# Patient Record
Sex: FEMALE | Race: WHITE | NOT HISPANIC OR LATINO | Employment: FULL TIME | URBAN - METROPOLITAN AREA
[De-identification: names, ages, dates, MRNs, and addresses within clinical notes are randomized per-mention and may not be internally consistent; named-entity substitution may affect disease eponyms.]

---

## 2023-08-18 ENCOUNTER — HOSPITAL ENCOUNTER (OUTPATIENT)
Dept: DATA CONVERSION | Facility: HOSPITAL | Age: 43
Discharge: HOME | End: 2023-08-18
Payer: COMMERCIAL

## 2023-08-18 DIAGNOSIS — G35 MULTIPLE SCLEROSIS (MULTI): ICD-10-CM

## 2023-09-07 PROBLEM — E03.8 SUBCLINICAL HYPOTHYROIDISM: Status: ACTIVE | Noted: 2023-09-07

## 2023-09-07 PROBLEM — G35 MULTIPLE SCLEROSIS (MULTI): Status: ACTIVE | Noted: 2023-09-07

## 2023-09-07 PROBLEM — E78.2 MIXED HYPERLIPIDEMIA: Status: ACTIVE | Noted: 2023-09-07

## 2023-09-07 PROBLEM — E55.9 VITAMIN D DEFICIENCY: Status: ACTIVE | Noted: 2023-09-07

## 2023-09-07 RX ORDER — IBUPROFEN 200 MG
600 TABLET ORAL EVERY 4 HOURS PRN
COMMUNITY

## 2023-09-07 RX ORDER — CHOLECALCIFEROL (VITAMIN D3) 25 MCG
1 TABLET ORAL DAILY
COMMUNITY
Start: 2022-12-09

## 2023-09-07 RX ORDER — ACETAMINOPHEN 500 MG
5 TABLET ORAL NIGHTLY
COMMUNITY
Start: 2022-12-09

## 2023-09-07 RX ORDER — BIOTIN 10 MG
1 TABLET ORAL DAILY
COMMUNITY
Start: 2022-12-09

## 2023-09-07 RX ORDER — NORGESTIMATE AND ETHINYL ESTRADIOL 7DAYSX3 28
1 KIT ORAL DAILY
COMMUNITY
End: 2024-03-05 | Stop reason: SDUPTHER

## 2023-09-07 RX ORDER — CHOLECALCIFEROL (VITAMIN D3) 125 MCG
CAPSULE ORAL
COMMUNITY
Start: 2022-12-09

## 2023-12-04 ENCOUNTER — OFFICE VISIT (OUTPATIENT)
Dept: NEUROLOGY | Facility: CLINIC | Age: 43
End: 2023-12-04
Payer: COMMERCIAL

## 2023-12-04 DIAGNOSIS — G35 MULTIPLE SCLEROSIS (MULTI): Primary | ICD-10-CM

## 2023-12-04 PROCEDURE — 99214 OFFICE O/P EST MOD 30 MIN: CPT | Performed by: PSYCHIATRY & NEUROLOGY

## 2023-12-04 NOTE — PATIENT INSTRUCTIONS
You continue to do well on Ocrevus with no new lesions on your MRI this past summer compared to 2022. Please have your next Ocrevus infusion in January, but also get blood counts and antibody levels checked before the next dose. I will notify you of the results. I will see you back in 6 months.

## 2023-12-04 NOTE — PROGRESS NOTES
"Chief complaint:    MS    History of Present Illness:     Wendy continues to do well with a little blurriness in her right eye after optic neuritis in 2008. She has not had other symptoms. She has not had new visual symptoms, headache, memory issues, difficulty with speech or swallowing. She has not had weakness, numbness, balance problems or urinary symptoms. She ran her first marathon this year and recently ran a half marathon. She continues to run on a treadmill and do weight training during the winter.     She has not had any problem with Ocrevus and will get the next infusion in January. She gets enough sleep. She has not had depression or fatigue but admits to having anxiety - \"not being able to turn my brain off,\" which can affect her ability to fall asleep.      Physical examination:    She is a very pleasant, healthy-appearing woman.  Neurologic Exam     Mental Status   Her speech was clear, fluent and appropriate.     Cranial Nerves     CN II   Visual fields full to confrontation.     CN III, IV, VI   Extraocular motions are normal.     CN V   Facial sensation intact.     CN VII   Facial expression full, symmetric.     Diminished acuity with the right eye with no red desaturation.     Motor Exam   Muscle bulk: normal  Overall muscle tone: normal    Strength   Right deltoid: 5/5  Left deltoid: 5/5  Right biceps: 5/5  Left biceps: 5/5  Right interossei: 5/5  Left interossei: 5/5  Right iliopsoas: 5/5  Left iliopsoas: 5/5    Sensory Exam   Light touch normal.     Gait, Coordination, and Reflexes     Coordination   Finger to nose coordination: normal    Reflexes   Right brachioradialis: 2+  Left brachioradialis: 2+  Right biceps: 1+  Left biceps: 1+  Right triceps: 2+  Left triceps: 2+  Right patellar: 1+  Left patellar: 1+  Right achilles: 2+  Left achilles: 2+  Right plantar: normal  Left plantar: normal  Her gait was narrow-based and steady. She could perform a tandem gait and hop repeatedly on either foot. "          No diagnosis found.   1. Multiple sclerosis (CMS/HCC)  CBC and Auto Differential    Immunoglobulins (IgG, IgA, IgM)           No orders of the defined types were placed in this encounter.         Impression and Plan:    Wendy continues to remain asymptomatic except for right eye blurriness from optic neuritis in 2008, but has had increasing white matter lesions on several MS drugs, but MRI of the brain in August did not show new lesions in the past year on Ocrevus. She will have blood counts and antibody levels checked. Her next Ocrevus infusion is in January. Follow up in 6 months and plan MRI of the brain in 8/24 unless new symptoms occur before then. We will then most likely transfer her care to Dr. Spencer since I will retire in 9/24.   Allison Bingham MD

## 2023-12-08 ENCOUNTER — LAB (OUTPATIENT)
Dept: LAB | Facility: LAB | Age: 43
End: 2023-12-08
Payer: COMMERCIAL

## 2023-12-08 DIAGNOSIS — G35 MULTIPLE SCLEROSIS (MULTI): ICD-10-CM

## 2023-12-08 LAB
BASOPHILS # BLD AUTO: 0.06 X10*3/UL (ref 0–0.1)
BASOPHILS NFR BLD AUTO: 1.2 %
EOSINOPHIL # BLD AUTO: 0.11 X10*3/UL (ref 0–0.7)
EOSINOPHIL NFR BLD AUTO: 2.3 %
ERYTHROCYTE [DISTWIDTH] IN BLOOD BY AUTOMATED COUNT: 13 % (ref 11.5–14.5)
HCT VFR BLD AUTO: 39.5 % (ref 36–46)
HGB BLD-MCNC: 12.6 G/DL (ref 12–16)
IMM GRANULOCYTES # BLD AUTO: 0.01 X10*3/UL (ref 0–0.7)
IMM GRANULOCYTES NFR BLD AUTO: 0.2 % (ref 0–0.9)
LYMPHOCYTES # BLD AUTO: 1.17 X10*3/UL (ref 1.2–4.8)
LYMPHOCYTES NFR BLD AUTO: 24.3 %
MCH RBC QN AUTO: 31.5 PG (ref 26–34)
MCHC RBC AUTO-ENTMCNC: 31.9 G/DL (ref 32–36)
MCV RBC AUTO: 99 FL (ref 80–100)
MONOCYTES # BLD AUTO: 0.42 X10*3/UL (ref 0.1–1)
MONOCYTES NFR BLD AUTO: 8.7 %
NEUTROPHILS # BLD AUTO: 3.05 X10*3/UL (ref 1.2–7.7)
NEUTROPHILS NFR BLD AUTO: 63.3 %
NRBC BLD-RTO: 1.5 /100 WBCS (ref 0–0)
PLATELET # BLD AUTO: 310 X10*3/UL (ref 150–450)
RBC # BLD AUTO: 4 X10*6/UL (ref 4–5.2)
WBC # BLD AUTO: 4.8 X10*3/UL (ref 4.4–11.3)

## 2023-12-08 PROCEDURE — 36415 COLL VENOUS BLD VENIPUNCTURE: CPT

## 2023-12-08 PROCEDURE — 85025 COMPLETE CBC W/AUTO DIFF WBC: CPT

## 2023-12-08 PROCEDURE — 82784 ASSAY IGA/IGD/IGG/IGM EACH: CPT

## 2023-12-09 LAB
IGA SERPL-MCNC: 136 MG/DL (ref 70–400)
IGG SERPL-MCNC: 879 MG/DL (ref 700–1600)
IGM SERPL-MCNC: 13 MG/DL (ref 40–230)

## 2024-01-30 ASSESSMENT — PROMIS GLOBAL HEALTH SCALE
CARRYOUT_PHYSICAL_ACTIVITIES: COMPLETELY
RATE_SOCIAL_SATISFACTION: EXCELLENT
RATE_MENTAL_HEALTH: VERY GOOD
RATE_GENERAL_HEALTH: VERY GOOD
RATE_PHYSICAL_HEALTH: EXCELLENT
RATE_QUALITY_OF_LIFE: EXCELLENT
EMOTIONAL_PROBLEMS: NEVER
RATE_AVERAGE_PAIN: 2
CARRYOUT_SOCIAL_ACTIVITIES: EXCELLENT

## 2024-01-31 ENCOUNTER — OFFICE VISIT (OUTPATIENT)
Dept: PRIMARY CARE | Facility: CLINIC | Age: 44
End: 2024-01-31
Payer: COMMERCIAL

## 2024-01-31 VITALS
SYSTOLIC BLOOD PRESSURE: 104 MMHG | HEART RATE: 48 BPM | BODY MASS INDEX: 22.53 KG/M2 | OXYGEN SATURATION: 99 % | TEMPERATURE: 97.7 F | WEIGHT: 132 LBS | HEIGHT: 64 IN | DIASTOLIC BLOOD PRESSURE: 64 MMHG

## 2024-01-31 DIAGNOSIS — E55.9 VITAMIN D DEFICIENCY: ICD-10-CM

## 2024-01-31 DIAGNOSIS — E03.8 SUBCLINICAL HYPOTHYROIDISM: ICD-10-CM

## 2024-01-31 DIAGNOSIS — Z00.00 ANNUAL PHYSICAL EXAM: Primary | ICD-10-CM

## 2024-01-31 DIAGNOSIS — G35 MULTIPLE SCLEROSIS (MULTI): ICD-10-CM

## 2024-01-31 DIAGNOSIS — E78.2 MIXED HYPERLIPIDEMIA: ICD-10-CM

## 2024-01-31 DIAGNOSIS — Z12.31 SCREENING MAMMOGRAM FOR BREAST CANCER: ICD-10-CM

## 2024-01-31 PROCEDURE — 99396 PREV VISIT EST AGE 40-64: CPT | Performed by: INTERNAL MEDICINE

## 2024-01-31 PROCEDURE — 1036F TOBACCO NON-USER: CPT | Performed by: INTERNAL MEDICINE

## 2024-01-31 ASSESSMENT — ENCOUNTER SYMPTOMS
OCCASIONAL FEELINGS OF UNSTEADINESS: 0
DEPRESSION: 0
LOSS OF SENSATION IN FEET: 0

## 2024-01-31 ASSESSMENT — PATIENT HEALTH QUESTIONNAIRE - PHQ9
2. FEELING DOWN, DEPRESSED OR HOPELESS: NOT AT ALL
1. LITTLE INTEREST OR PLEASURE IN DOING THINGS: NOT AT ALL
SUM OF ALL RESPONSES TO PHQ9 QUESTIONS 1 AND 2: 0

## 2024-01-31 ASSESSMENT — COLUMBIA-SUICIDE SEVERITY RATING SCALE - C-SSRS
1. IN THE PAST MONTH, HAVE YOU WISHED YOU WERE DEAD OR WISHED YOU COULD GO TO SLEEP AND NOT WAKE UP?: NO
2. HAVE YOU ACTUALLY HAD ANY THOUGHTS OF KILLING YOURSELF?: NO
6. HAVE YOU EVER DONE ANYTHING, STARTED TO DO ANYTHING, OR PREPARED TO DO ANYTHING TO END YOUR LIFE?: NO

## 2024-01-31 ASSESSMENT — PAIN SCALES - GENERAL: PAINLEVEL: 0-NO PAIN

## 2024-01-31 NOTE — PROGRESS NOTES
"Subjective   Patient ID: Wendy Suggs is a 43 y.o. female who presents for Annual Exam.    HPI     Has past medical history of multiple sclerosis. Symptoms well controlled on current Ocrevus infusion.         She is a marathon runner, did full [place] in May 2023             Review of Systems   Constitutional:  Negative for chills, fatigue and unexpected weight change.   HENT:  Negative for postnasal drip, sinus pressure and trouble swallowing.    Respiratory:  Negative for cough, shortness of breath and wheezing.    Cardiovascular:  Negative for chest pain, palpitations and leg swelling.   Gastrointestinal:  Negative for abdominal pain, blood in stool, nausea and vomiting.   Endocrine: Negative for polydipsia, polyphagia and polyuria.   Genitourinary:  Negative for dysuria and frequency.   Musculoskeletal:  Negative for back pain and myalgias.   Skin:  Negative for rash.   Neurological:  Negative for tremors, seizures and numbness.   Psychiatric/Behavioral:  Negative for behavioral problems.        Objective   /64 (BP Location: Left arm, Patient Position: Sitting, BP Cuff Size: Small adult)   Pulse (!) 48   Temp 36.5 °C (97.7 °F) (Temporal)   Ht 1.626 m (5' 4\")   Wt 59.9 kg (132 lb)   SpO2 99%   BMI 22.66 kg/m²     Physical Exam  Constitutional:       General: She is not in acute distress.  HENT:      Head: Normocephalic and atraumatic.      Right Ear: Tympanic membrane normal.      Left Ear: Tympanic membrane normal.   Eyes:      Extraocular Movements: Extraocular movements intact.      Conjunctiva/sclera: Conjunctivae normal.      Pupils: Pupils are equal, round, and reactive to light.   Neck:      Vascular: No carotid bruit.   Cardiovascular:      Rate and Rhythm: Normal rate and regular rhythm.      Pulses: Normal pulses.      Heart sounds: No murmur heard.  Pulmonary:      Effort: Pulmonary effort is normal.      Breath sounds: Normal breath sounds. No wheezing or rales.   Abdominal:      General: " Bowel sounds are normal.      Palpations: Abdomen is soft. There is no mass.      Tenderness: There is no abdominal tenderness. There is no guarding.   Musculoskeletal:         General: Normal range of motion.      Cervical back: Normal range of motion and neck supple.      Right lower leg: No edema.      Left lower leg: No edema.   Lymphadenopathy:      Cervical: No cervical adenopathy.   Skin:     General: Skin is warm and dry.      Findings: No lesion.   Neurological:      General: No focal deficit present.      Mental Status: She is alert and oriented to person, place, and time.      Cranial Nerves: No cranial nerve deficit.      Gait: Gait normal.   Psychiatric:         Mood and Affect: Mood normal.         Assessment/Plan        Wendy was seen today for annual exam.  Diagnoses and all orders for this visit:  Annual physical exam (Primary)  Multiple sclerosis (CMS/HCC)  -     Comprehensive Metabolic Panel; Future  Subclinical hypothyroidism  -     TSH with reflex to Free T4 if abnormal; Future  Mixed hyperlipidemia  -     Lipid Panel; Future  Vitamin D deficiency  -     Vitamin D 25-Hydroxy,Total (for eval of Vitamin D levels); Future  Screening mammogram for breast cancer  -     BI mammo bilateral screening tomosynthesis; Future       Current Outpatient Medications   Medication Instructions    biotin 10 mg tablet 1 tablet, oral, Daily    cholecalciferol (Vitamin D-3) 25 MCG (1000 UT) tablet 1 tablet, oral, Daily    ibuprofen 600 mg, oral, Every 4 hours PRN    Lactobacillus acidophilus (Probiotic Acidophilus) 1.5 mg (250 million cell) capsule Probiotic Acidophilus Oral Capsule   Refills: 0        Allison Bingham MD   Start : 9-Dec-2022   Active    melatonin 5 mg, oral, Nightly    norgestimate-ethinyl estradioL (Tri-Estarylla) 0.18/0.215/0.25 mg-35 mcg (28) tablet 1 tablet, oral, Daily, for 90 day(s)    ocrelizumab (OCREVUS IV) intravenous, Every 6 months

## 2024-02-01 ASSESSMENT — ENCOUNTER SYMPTOMS
TREMORS: 0
NUMBNESS: 0
DYSURIA: 0
WHEEZING: 0
UNEXPECTED WEIGHT CHANGE: 0
SHORTNESS OF BREATH: 0
FREQUENCY: 0
VOMITING: 0
PALPITATIONS: 0
NAUSEA: 0
CHILLS: 0
BACK PAIN: 0
ABDOMINAL PAIN: 0
MYALGIAS: 0
POLYDIPSIA: 0
COUGH: 0
SEIZURES: 0
SINUS PRESSURE: 0
POLYPHAGIA: 0
FATIGUE: 0
TROUBLE SWALLOWING: 0
BLOOD IN STOOL: 0

## 2024-02-13 ENCOUNTER — LAB (OUTPATIENT)
Dept: LAB | Facility: LAB | Age: 44
End: 2024-02-13
Payer: COMMERCIAL

## 2024-02-13 ENCOUNTER — HOSPITAL ENCOUNTER (OUTPATIENT)
Dept: RADIOLOGY | Facility: HOSPITAL | Age: 44
Discharge: HOME | End: 2024-02-13
Payer: COMMERCIAL

## 2024-02-13 VITALS — HEIGHT: 64 IN | BODY MASS INDEX: 21.85 KG/M2 | WEIGHT: 128 LBS

## 2024-02-13 DIAGNOSIS — Z12.31 SCREENING MAMMOGRAM FOR BREAST CANCER: ICD-10-CM

## 2024-02-13 DIAGNOSIS — E55.9 VITAMIN D DEFICIENCY: ICD-10-CM

## 2024-02-13 DIAGNOSIS — G35 MULTIPLE SCLEROSIS (MULTI): ICD-10-CM

## 2024-02-13 DIAGNOSIS — E03.8 SUBCLINICAL HYPOTHYROIDISM: ICD-10-CM

## 2024-02-13 DIAGNOSIS — E78.2 MIXED HYPERLIPIDEMIA: ICD-10-CM

## 2024-02-13 LAB
25(OH)D3 SERPL-MCNC: 68 NG/ML (ref 30–100)
ALBUMIN SERPL BCP-MCNC: 4.1 G/DL (ref 3.4–5)
ALP SERPL-CCNC: 44 U/L (ref 33–110)
ALT SERPL W P-5'-P-CCNC: 11 U/L (ref 7–45)
ANION GAP SERPL CALC-SCNC: 12 MMOL/L (ref 10–20)
AST SERPL W P-5'-P-CCNC: 16 U/L (ref 9–39)
BILIRUB SERPL-MCNC: 0.5 MG/DL (ref 0–1.2)
BUN SERPL-MCNC: 16 MG/DL (ref 6–23)
CALCIUM SERPL-MCNC: 9.3 MG/DL (ref 8.6–10.3)
CHLORIDE SERPL-SCNC: 103 MMOL/L (ref 98–107)
CHOLEST SERPL-MCNC: 167 MG/DL (ref 0–199)
CHOLESTEROL/HDL RATIO: 1.9
CO2 SERPL-SCNC: 27 MMOL/L (ref 21–32)
CREAT SERPL-MCNC: 0.92 MG/DL (ref 0.5–1.05)
EGFRCR SERPLBLD CKD-EPI 2021: 80 ML/MIN/1.73M*2
GLUCOSE SERPL-MCNC: 85 MG/DL (ref 74–99)
HDLC SERPL-MCNC: 86.4 MG/DL
LDLC SERPL CALC-MCNC: 62 MG/DL
NON HDL CHOLESTEROL: 81 MG/DL (ref 0–149)
POTASSIUM SERPL-SCNC: 4.2 MMOL/L (ref 3.5–5.3)
PROT SERPL-MCNC: 6.4 G/DL (ref 6.4–8.2)
SODIUM SERPL-SCNC: 138 MMOL/L (ref 136–145)
TRIGL SERPL-MCNC: 94 MG/DL (ref 0–149)
TSH SERPL-ACNC: 2.9 MIU/L (ref 0.44–3.98)
VLDL: 19 MG/DL (ref 0–40)

## 2024-02-13 PROCEDURE — 80053 COMPREHEN METABOLIC PANEL: CPT

## 2024-02-13 PROCEDURE — 77067 SCR MAMMO BI INCL CAD: CPT

## 2024-02-13 PROCEDURE — 80061 LIPID PANEL: CPT

## 2024-02-13 PROCEDURE — 84443 ASSAY THYROID STIM HORMONE: CPT

## 2024-02-13 PROCEDURE — 36415 COLL VENOUS BLD VENIPUNCTURE: CPT

## 2024-02-13 PROCEDURE — 82306 VITAMIN D 25 HYDROXY: CPT

## 2024-03-05 DIAGNOSIS — Z30.9 ENCOUNTER FOR CONTRACEPTIVE MANAGEMENT, UNSPECIFIED TYPE: Primary | ICD-10-CM

## 2024-03-05 RX ORDER — NORGESTIMATE AND ETHINYL ESTRADIOL 7DAYSX3 28
1 KIT ORAL DAILY
Qty: 28 TABLET | Refills: 11 | Status: SHIPPED | OUTPATIENT
Start: 2024-03-05

## 2024-06-03 ENCOUNTER — APPOINTMENT (OUTPATIENT)
Dept: NEUROLOGY | Facility: CLINIC | Age: 44
End: 2024-06-03
Payer: COMMERCIAL

## 2024-06-17 ENCOUNTER — APPOINTMENT (OUTPATIENT)
Dept: NEUROLOGY | Facility: CLINIC | Age: 44
End: 2024-06-17
Payer: COMMERCIAL

## 2024-06-17 DIAGNOSIS — G35 MULTIPLE SCLEROSIS (MULTI): Primary | ICD-10-CM

## 2024-06-17 PROCEDURE — 99214 OFFICE O/P EST MOD 30 MIN: CPT | Performed by: PSYCHIATRY & NEUROLOGY

## 2024-06-17 NOTE — PATIENT INSTRUCTIONS
You continue to do well on Ocrevus every 6 months for MS. You have no symptoms except for some right vision loss from optic neuritis. You otherwise are normal. I will retire at the end of September. Please call before then with questions. Please make an appointment with Dr. Michael Spencer, an MS specialist at  in [place]. I have put in a referral for you to see him.

## 2024-06-17 NOTE — PROGRESS NOTES
Chief complaint:    MS    History of Present Illness:   Wendy feels fine. She gets Ocrevus July 15 and has not had any issues with it. Aside from blurry right vision from remote optic neuritis, she has no symptoms. Her energy is good, she has no heat sensitivity, and her strength and balance are fine. She has no numbness or tingling. No problems with bladder control. She has not had any cognitive issues. Her mood is good. She sleeps well. She continues to work full time without difficulty. No smoking. Social alcohol use.     Wendy recently ran a half marathon. She has been swimming and cycling in preparation for two triathlons in July and August. She has just started to swim in [facility].    Physical examination:  She is a very pleasant, healthy-appearing woman.    Neurologic Exam     Mental Status   Her speech was clear, fluent and appropriate.     Cranial Nerves     CN II   Visual fields full to confrontation.     CN III, IV, VI   Extraocular motions are normal.     CN V   Facial sensation intact.     CN VII   Facial expression full, symmetric.     Motor Exam   Muscle bulk: normal  Overall muscle tone: normal    Strength   Right biceps: 5/5  Left biceps: 5/5  Right triceps: 5/5  Left triceps: 5/5  Right interossei: 5/5  Left interossei: 5/5  Right iliopsoas: 5/5  Left iliopsoas: 5/5  Finger tapping was brisk bilaterally. Rapid alternating movements were coordinated bilaterally. No abnormal movements.     Sensory Exam   Light touch normal.   Pinprick normal.     Gait, Coordination, and Reflexes     Reflexes   Right brachioradialis: 2+  Left brachioradialis: 2+  Right biceps: 2+  Left biceps: 2+  Right triceps: 2+  Left triceps: 2+  Right patellar: 1+  Left patellar: 0  Right achilles: 1+  Left achilles: 0  Right plantar: normal  Left plantar: normal  Her gait was narrow-based and steady. She could walk on her heels or toes, perform a tandem gait and hop repeatedly on either foot.          1. Multiple sclerosis  (Multi)  Referral to Neurology             Orders Placed This Encounter   Procedures    Referral to Neurology     Standing Status:   Future     Standing Expiration Date:   6/17/2025     Referral Priority:   Routine     Referral Type:   Consultation     Referral Reason:   Specialty Services Required     Requested Specialty:   Neurology     Number of Visits Requested:   1          Impression and Plan:  Wendy was diagnosed with MS in 2008 after she developed right optic neuritis. She took Betaseron, then Gilenya, but developed new lesions on MRI with both. She then switched to Tysabri but became positive for JCV. She then switched to Ocrevus and has done well, remaining asymptomatic except for residual right visual loss.     She remains stable on Ocrevus and has run marathons and half marathons and is training for triathlons this summer. Her next dose of Ocrevus is in mid-July. I will retire at the end of September, so she will consult Dr. Michael Spencer after that.    Allison Bingham MD

## 2024-07-31 ENCOUNTER — APPOINTMENT (OUTPATIENT)
Dept: OPHTHALMOLOGY | Facility: CLINIC | Age: 44
End: 2024-07-31
Payer: COMMERCIAL

## 2024-07-31 DIAGNOSIS — H47.20 OPTIC ATROPHY: Primary | ICD-10-CM

## 2024-07-31 DIAGNOSIS — H52.13 MYOPIA OF BOTH EYES: ICD-10-CM

## 2024-07-31 DIAGNOSIS — H35.372 EPIRETINAL MEMBRANE (ERM) OF LEFT EYE: ICD-10-CM

## 2024-07-31 PROCEDURE — FLVLF CONTACT LENS EVALUATION (SP): Performed by: STUDENT IN AN ORGANIZED HEALTH CARE EDUCATION/TRAINING PROGRAM

## 2024-07-31 PROCEDURE — 92004 COMPRE OPH EXAM NEW PT 1/>: CPT | Performed by: STUDENT IN AN ORGANIZED HEALTH CARE EDUCATION/TRAINING PROGRAM

## 2024-07-31 PROCEDURE — 92134 CPTRZ OPH DX IMG PST SGM RTA: CPT | Performed by: STUDENT IN AN ORGANIZED HEALTH CARE EDUCATION/TRAINING PROGRAM

## 2024-07-31 PROCEDURE — 92015 DETERMINE REFRACTIVE STATE: CPT | Performed by: STUDENT IN AN ORGANIZED HEALTH CARE EDUCATION/TRAINING PROGRAM

## 2024-07-31 ASSESSMENT — REFRACTION
OS_CYLINDER: -0.50
OD_CYLINDER: -0.25
OD_AXIS: 125
OS_AXIS: 120
OD_SPHERE: -0.25
OS_SPHERE: -1.00
OS_AXIS: 105
OS_CYLINDER: -0.50
OS_SPHERE: -1.00
OD_SPHERE: -0.25
OD_CYLINDER: -0.25
OD_AXIS: 125

## 2024-07-31 ASSESSMENT — REFRACTION_CURRENTRX
OS_DIAMETER: 14.3
OS_SPHERE: -2.00
OS_SPHERE: -1.25
OD_BASECURVE: 8.5
OS_BASECURVE: 8.5
OS_BRAND: ACUVUE OASYS 1 DAY
OS_BRAND: ACUVUE OASYS 1 DAY
OS_DIAMETER: 14.3
OS_BASECURVE: 8.5

## 2024-07-31 ASSESSMENT — REFRACTION_MANIFEST
OS_CYLINDER: -0.50
METHOD_AUTOREFRACTION: 1
OD_AXIS: 082
OS_SPHERE: -2.50
OD_SPHERE: -1.25
OD_CYLINDER: -0.50
OS_AXIS: 107
OD_ADD: +2.00
OS_CYLINDER: -0.50
OD_SPHERE: -1.25
OS_AXIS: 110
OS_ADD: +2.00
OD_AXIS: 080
OS_SPHERE: -2.50
OD_CYLINDER: -0.50

## 2024-07-31 ASSESSMENT — EXTERNAL EXAM - RIGHT EYE: OD_EXAM: NORMAL

## 2024-07-31 ASSESSMENT — REFRACTION_WEARINGRX
OS_SPHERE: -2.00
OD_SPHERE: -1.50
OD_CYLINDER: SPHERE
OS_CYLINDER: SPHERE

## 2024-07-31 ASSESSMENT — ENCOUNTER SYMPTOMS
PSYCHIATRIC NEGATIVE: 0
CONSTITUTIONAL NEGATIVE: 0
ENDOCRINE NEGATIVE: 0
MUSCULOSKELETAL NEGATIVE: 0
HEMATOLOGIC/LYMPHATIC NEGATIVE: 0
CARDIOVASCULAR NEGATIVE: 0
RESPIRATORY NEGATIVE: 0
ALLERGIC/IMMUNOLOGIC NEGATIVE: 0
NEUROLOGICAL NEGATIVE: 0
GASTROINTESTINAL NEGATIVE: 0
EYES NEGATIVE: 1

## 2024-07-31 ASSESSMENT — VISUAL ACUITY
OS_CC+: -1
OS_CC: 20/20
METHOD: SNELLEN - LINEAR
CORRECTION_TYPE: CONTACTS
VA_OR_OS_CURRENT_RX: 20/20-1
VA_OR_OS_CURRENT_RX: 20/20-2
OD_SC: 20/400

## 2024-07-31 ASSESSMENT — CONF VISUAL FIELD
OD_INFERIOR_NASAL_RESTRICTION: 0
OS_INFERIOR_NASAL_RESTRICTION: 0
OS_SUPERIOR_TEMPORAL_RESTRICTION: 0
OD_SUPERIOR_TEMPORAL_RESTRICTION: 0
OS_NORMAL: 1
OS_SUPERIOR_NASAL_RESTRICTION: 0
OD_INFERIOR_TEMPORAL_RESTRICTION: 0
OS_INFERIOR_TEMPORAL_RESTRICTION: 0
OD_NORMAL: 1
OD_SUPERIOR_NASAL_RESTRICTION: 0

## 2024-07-31 ASSESSMENT — TONOMETRY
OS_IOP_MMHG: 15
OD_IOP_MMHG: 15
IOP_METHOD: GOLDMANN APPLANATION

## 2024-07-31 ASSESSMENT — EXTERNAL EXAM - LEFT EYE: OS_EXAM: NORMAL

## 2024-07-31 ASSESSMENT — CUP TO DISC RATIO: OD_RATIO: 0.1

## 2024-07-31 ASSESSMENT — SLIT LAMP EXAM - LIDS
COMMENTS: TRACE CAPPED GLANDS
COMMENTS: TRACE CAPPED GLANDS

## 2024-08-01 PROBLEM — H47.20 OPTIC ATROPHY: Status: ACTIVE | Noted: 2024-08-01

## 2024-08-01 PROBLEM — H52.13 MYOPIA OF BOTH EYES: Status: ACTIVE | Noted: 2024-08-01

## 2024-08-01 PROBLEM — H35.372 EPIRETINAL MEMBRANE (ERM) OF LEFT EYE: Status: ACTIVE | Noted: 2024-08-01

## 2024-08-01 NOTE — PROGRESS NOTES
Assessment/Plan   Diagnoses and all orders for this visit:  Optic atrophy  (+)med hx of multiple sclerosis currently controlled with Ocrevus   (+)hx of optic neuritis right eye-since event pt has had longstanding decrease in vision right eye  (+)afferent pupillary defect (APD) OD today with BCVA 20/400   (+)OCT RNFL demonstrates RNFL thinning OD; normal OS  (+)pt ed to RTC immediately for any changes to vision, eye pain, pain with eye movement  (+)monitor  Epiretinal membrane (ERM) of left eye  (+)ERM noted on DFE and OCT MAC today with early lamellar hole vs cystic changes   (+)Ed to monitor vision-RTC immediately for any changes-otherwise monitor every 6 months with DFE and OCT MAC  Myopia of both eyes  -Discussed proper wear, care, replacement of contact lenses. Gave handout. D/c cl wear and RTC if eyes become red, painful, irritated. Monitor 1 year.   CL eval billed today. $35 Finalized RX for Acuvue Oasys 1 Day OS only. Decrease in minus accepted on MRX today. Will send trials of the finalized RX to patient.   -updated and dispensed spec RX    RTC 6 months for DFE and MAC OCT

## 2024-10-16 ENCOUNTER — PATIENT MESSAGE (OUTPATIENT)
Dept: NEUROLOGY | Facility: CLINIC | Age: 44
End: 2024-10-16
Payer: COMMERCIAL

## 2024-11-14 ENCOUNTER — APPOINTMENT (OUTPATIENT)
Dept: NEUROLOGY | Facility: CLINIC | Age: 44
End: 2024-11-14
Payer: COMMERCIAL

## 2024-12-05 ENCOUNTER — LAB (OUTPATIENT)
Dept: LAB | Facility: LAB | Age: 44
End: 2024-12-05
Payer: COMMERCIAL

## 2024-12-05 ENCOUNTER — OFFICE VISIT (OUTPATIENT)
Dept: NEUROLOGY | Facility: CLINIC | Age: 44
End: 2024-12-05
Payer: COMMERCIAL

## 2024-12-05 VITALS — SYSTOLIC BLOOD PRESSURE: 113 MMHG | DIASTOLIC BLOOD PRESSURE: 81 MMHG | HEART RATE: 60 BPM

## 2024-12-05 DIAGNOSIS — G35 MULTIPLE SCLEROSIS (MULTI): Primary | ICD-10-CM

## 2024-12-05 DIAGNOSIS — G35 MULTIPLE SCLEROSIS (MULTI): ICD-10-CM

## 2024-12-05 LAB
BASOPHILS # BLD AUTO: 0.06 X10*3/UL (ref 0–0.1)
BASOPHILS # BLD AUTO: 0.06 X10*3/UL (ref 0–0.1)
BASOPHILS NFR BLD AUTO: 1.3 %
BASOPHILS NFR BLD AUTO: 1.3 %
EOSINOPHIL # BLD AUTO: 0.11 X10*3/UL (ref 0–0.7)
EOSINOPHIL # BLD AUTO: 0.12 X10*3/UL (ref 0–0.7)
EOSINOPHIL NFR BLD AUTO: 2.3 %
EOSINOPHIL NFR BLD AUTO: 2.5 %
ERYTHROCYTE [DISTWIDTH] IN BLOOD BY AUTOMATED COUNT: 13.1 % (ref 11.5–14.5)
ERYTHROCYTE [DISTWIDTH] IN BLOOD BY AUTOMATED COUNT: 13.2 % (ref 11.5–14.5)
HBV CORE AB SER QL: NONREACTIVE
HBV SURFACE AB SER-ACNC: 9 MIU/ML
HBV SURFACE AG SERPL QL IA: NONREACTIVE
HCT VFR BLD AUTO: 41.7 % (ref 36–46)
HCT VFR BLD AUTO: 41.7 % (ref 36–46)
HGB BLD-MCNC: 13.8 G/DL (ref 12–16)
HGB BLD-MCNC: 13.8 G/DL (ref 12–16)
IGA SERPL-MCNC: 132 MG/DL (ref 70–400)
IGG SERPL-MCNC: 918 MG/DL (ref 700–1600)
IGM SERPL-MCNC: 14 MG/DL (ref 40–230)
IMM GRANULOCYTES # BLD AUTO: 0.01 X10*3/UL (ref 0–0.7)
IMM GRANULOCYTES # BLD AUTO: 0.02 X10*3/UL (ref 0–0.7)
IMM GRANULOCYTES NFR BLD AUTO: 0.2 % (ref 0–0.9)
IMM GRANULOCYTES NFR BLD AUTO: 0.4 % (ref 0–0.9)
LYMPHOCYTES # BLD AUTO: 1.33 X10*3/UL (ref 1.2–4.8)
LYMPHOCYTES # BLD AUTO: 1.34 X10*3/UL (ref 1.2–4.8)
LYMPHOCYTES NFR BLD AUTO: 28.2 %
LYMPHOCYTES NFR BLD AUTO: 28.3 %
MCH RBC QN AUTO: 31.3 PG (ref 26–34)
MCH RBC QN AUTO: 31.3 PG (ref 26–34)
MCHC RBC AUTO-ENTMCNC: 33.1 G/DL (ref 32–36)
MCHC RBC AUTO-ENTMCNC: 33.1 G/DL (ref 32–36)
MCV RBC AUTO: 95 FL (ref 80–100)
MCV RBC AUTO: 95 FL (ref 80–100)
MONOCYTES # BLD AUTO: 0.38 X10*3/UL (ref 0.1–1)
MONOCYTES # BLD AUTO: 0.39 X10*3/UL (ref 0.1–1)
MONOCYTES NFR BLD AUTO: 8.1 %
MONOCYTES NFR BLD AUTO: 8.2 %
NEUTROPHILS # BLD AUTO: 2.81 X10*3/UL (ref 1.2–7.7)
NEUTROPHILS # BLD AUTO: 2.82 X10*3/UL (ref 1.2–7.7)
NEUTROPHILS NFR BLD AUTO: 59.5 %
NEUTROPHILS NFR BLD AUTO: 59.7 %
NRBC BLD-RTO: 0 /100 WBCS (ref 0–0)
NRBC BLD-RTO: 0 /100 WBCS (ref 0–0)
PLATELET # BLD AUTO: 269 X10*3/UL (ref 150–450)
PLATELET # BLD AUTO: 271 X10*3/UL (ref 150–450)
RBC # BLD AUTO: 4.41 X10*6/UL (ref 4–5.2)
RBC # BLD AUTO: 4.41 X10*6/UL (ref 4–5.2)
VARICELLA ZOSTER IGG INDEX: 5.1 IA
VZV IGG SER QL IA: POSITIVE
WBC # BLD AUTO: 4.7 X10*3/UL (ref 4.4–11.3)
WBC # BLD AUTO: 4.7 X10*3/UL (ref 4.4–11.3)

## 2024-12-05 PROCEDURE — 86481 TB AG RESPONSE T-CELL SUSP: CPT

## 2024-12-05 PROCEDURE — 99215 OFFICE O/P EST HI 40 MIN: CPT | Mod: GC | Performed by: PSYCHIATRY & NEUROLOGY

## 2024-12-05 PROCEDURE — 36415 COLL VENOUS BLD VENIPUNCTURE: CPT

## 2024-12-05 PROCEDURE — 85025 COMPLETE CBC W/AUTO DIFF WBC: CPT

## 2024-12-05 PROCEDURE — 99215 OFFICE O/P EST HI 40 MIN: CPT | Performed by: PSYCHIATRY & NEUROLOGY

## 2024-12-05 ASSESSMENT — PAIN SCALES - GENERAL: PAINLEVEL_OUTOF10: 0-NO PAIN

## 2024-12-05 NOTE — PATIENT INSTRUCTIONS
Dear Mrs. Suggs,    You were seen at  Neurology for multiple sclerosis. Your symptoms are stable so you are okay to continue on Ocrevus. We have ordered a repeat brain MRI as well as some blood work. Please reach out if you have any problems.    Thank you for entrusting us with your care.    Sincerely,  Your  Neurology Team

## 2024-12-05 NOTE — PROGRESS NOTES
Subjective     Wendy Suggs is a 44 y.o. year old female      History of Present Illness   [facility] NEURO IMMUNOLOGY HPI: Date of Onset: 2008  Date of Diagnosis: 2008  Last MRI Brain: 8/2023  Last MRI Cervical: 8/2023  Last MRI Thoracis: n/a  Last OCT/VEP: 7/2024  Last CBC: 12/2023  Disease Course at Onset: R (right optic neuritis)  Disease Course Now: R, inactive  Current Disease Modifying Therapies: started on Ocrevus 2019 and has been doing well  Previous Disease Modifying Therapies:  She took Betaseron, then Gilenya, but developed new lesions on MRI with both. She then switched to Tysabri but became positive for JCV.  Cerebrospinal Fluid: n/.a  Garret Gutierrez Virus: n/a  Varicella Zoster Virus: n/a  Hep Panel: n/a  Neuromyelitis Optica: n/a  Myelin Oligodendrocyte Glycoprotein: n/a    HPI  She first presented with right optic neuritis in 2008. It was blurry vision in right eye and she could not focus. As the week went on then she had eye pain as well. Things worsened so she went to emergency ophtho appointment and she was told she had some retinal pathology. She then some her PCP who had suspicions of MS and then she was diagnosed with MS and ordered MRI. She did not need spinal tap. Her right eye vision never cleared up. Kept having new lesion son MRI so transitioned between a few MRI treatments until she ended up on Ocrevus. She has never had additional symptoms. All her new lesions were silent lesions.    MS Symptom Review: Weakness: No Weakness  Sensory Changes: No sensory changes  Incoordination: No incoordination   Falling: No falling  Gait Change: No gait change  Painful Vision Loss: No painful vision loss  Double Vision: No double vision   Vertigo: No vertigo  Facial/Bulvar Weakness: No facial/bulvar weakness  Bladder/Bowel Dysfunction: No bladder/bowel dysfunction   Spasticity: No spasticity  Tonic Spasms: No tonic spasms  Tremors: No tremors  Restless Leg Syndrome: No restless leg syndrome   Dystonia:  No dystonia  Other Movement Disorders: No other movement disorders  Heat Sensitivity: No heat sensitivity  Fatigue: No fatigue  Depression: No depression     ROS  10-point review of systems was negative except as mentioned in the HPI and/or the [facility] form.     Patient Active Problem List   Diagnosis    Mixed hyperlipidemia    Multiple sclerosis (Multi)    Subclinical hypothyroidism    Vitamin D deficiency    Epiretinal membrane (ERM) of left eye    Optic atrophy    Myopia of both eyes        No past surgical history on file.  Social History     Tobacco Use    Smoking status: Former     Current packs/day: 0.00     Types: Cigarettes     Start date: 2000     Quit date: 2012     Years since quittin.3    Smokeless tobacco: Never   Substance Use Topics    Alcohol use: Yes     Alcohol/week: 3.0 standard drinks of alcohol     Types: 3 Cans of beer per week     Comment: Social drinker mostly on weekends     Family History   Problem Relation Name Age of Onset    Lymphoma Mother Shahnaz Pereira     Cancer Mother Shahnaz Pereira     Diabetes Mother Shahnaz Pereira     Hypertension Mother Shahnaz Pereira     Hearing loss Maternal Grandmother Kenia Escobar     Diabetes Mother's Sister Margaret Escobar     Stroke Mother's Sister Margaret Escobar       No Known Allergies    Visit Vitals  /81   Pulse 60   OB Status Having periods   Smoking Status Former       Objective   Neurological Exam  Physical Exam    Mental status: Ms. Suggs is awake, alert and appropriate, with fluent speech, no word finding difficulties and no difficulty answering questions. She is well oriented to time, place and person. Her memory for remote events is intact. Her memory for recent events is normal. Attention and concentration is intact.  Her fund of knowledge is fair. No apraxia is noted.  Fundoscopic examination: Fundi are flat with sharp disc margins bilaterally.  Cranial nerves:   CN 2: Pupils are equal, round and reactive to light bilaterally.  Visual fields are full to confrontation without visual extinction. Endorses right eye blurry vision.  CN 3, 4, 6: Extraocular movements are intact.  Smooth pursuit is intact.  Saccades are normal initiation, velocity and amplitude both vertically and horizontally.   CN 5: Facial sensation is intact to light touch bilaterally.  CN 7: Facial strength is full bilaterally.   CN 8: Hearing is intact to finger rub bilaterally.  CN 9, 10: The soft palate elevates symmetrically.   CN 11: Sternocleidomastoid and trapezius muscle strength is full bilaterally.  CN 12: Tongue protrudes in the midline with intact strength. There are no tongue fasciculations noted.              Motor Examination: Bulk is normal throughout.  Axilla and upper and lower extremity tone is normal.  No pronator drift is noted.  Strength testing as below.  No fasciculations are noted.  There is no tremor or other involuntary movement.    Strength L R   Deltoid 5 5   Biceps 5 5   Triceps 5 5    5 5   Iliopsoas 5 5   Gluteal Ext 5 5   Gluteal Abduction 5 5   Quadriceps 5 5   Hamstrings 5 5   Tensor Fasciae Latae 5 5   TA 5 5   Gastroc 5 5     Sensory examination: Sensation is intact to light touch.  Reflexes: Positioning during reflex exam: patient sitting in chair. Reflex testing as below.    Reflexes L R   Biceps trace trace   Triceps trace trace   Brachioradialis trace trace   Patellar trace trace   Achilles mute mute     Coordination: Finger-nose-finger and heel-knee-shin testing is normal with no dysmetria bilaterally.   Gait: She has an upright and steady stance with normal stride length and arm swing.  Toe, heel, and tandem walking is intact.  No Romberg's sign is present.         CLINICAL SCORES      Assessment/Plan     This is a 44 y.o. year old  right handed female  With a PMH of MS who presents with some residual right blurry vision here to establish care after her neurologist retired. She is well-controlled on ocrevus. We will continue her  on ocrevus.    The Neurological Exam showed right blurry vision.   The MRI Showed  Findings in keeping with patient's known history of multiple sclerosis  with scattered periventricular and subcortical white matter foci of  hyperintensity with demyelinating plaques. No active demyelinating  plaques with no foci of enhancement or restriction demonstrated. No  recent examination available to characterize interval change with only  remote exam available from 2008..   Cerebrospinal Fluid n/a  OCT/VEP OD: ; good contour (-)SRF/IRF   OS: ; good contour (+)mild ERM c VMA causing intra-retinal cystic   changes/early lamellar hole   MS Mimics n/a  The overall picture is suggestive of MS  Disease Modifying Therapies Ocrevus     Plan:  - continues ocrevus  - ocrevus blood work being sent today  - MRI w w/o contrast ordered    Yash Darby MD PhD  PGY-2 Neurology      Orders Placed This Encounter   Procedures    MR brain w and wo IV contrast     Standing Status:   Future     Standing Expiration Date:   12/5/2025     Order Specific Question:   Reason for exam:     Answer:   right eye blurry vision MS     Order Specific Question:   May utilize  for port access if port present for this exam.     Answer:   Yes     Order Specific Question:   Does the patient have a Cochlear Implant, Pacemaker, Defibrillator, Pacing Wire, Brain Aneurysm Clip, Implanted Nerve or Bone Graft Stimulator, Implanted Breast Tissue Expander, Glucose Monitor or Neulasta Device?     Answer:   No     Order Specific Question:   Radiologist to Determine Optimal Study     Answer:   Yes     Order Specific Question:   Release result to [employer]     Answer:   Immediate [1]     Order Specific Question:   Is this exam part of a Research Study? If Yes, link this order to the research study     Answer:   No    CBC and Auto Differential     Standing Status:   Future     Number of Occurrences:   1     Standing Expiration Date:   12/5/2025     Order  Specific Question:   Release result to MyChart     Answer:   Immediate [1]    Immunoglobulins (IgG, IgA, IgM)     Standing Status:   Future     Number of Occurrences:   1     Standing Expiration Date:   12/5/2025     Order Specific Question:   Release result to MyChart     Answer:   Immediate [1]    Hepatitis B Core Antibody, Total     Standing Status:   Future     Number of Occurrences:   1     Standing Expiration Date:   12/5/2025     Order Specific Question:   Release result to MyChart     Answer:   Immediate [1]    Hepatitis B Surface Antibody     Standing Status:   Future     Number of Occurrences:   1     Standing Expiration Date:   12/5/2025     Order Specific Question:   Release result to MyChart     Answer:   Immediate [1]    Hepatitis B Surface Antigen     Standing Status:   Future     Number of Occurrences:   1     Standing Expiration Date:   12/5/2025     Order Specific Question:   Release result to MyChart     Answer:   Immediate [1]    T-Spot TB     Standing Status:   Future     Number of Occurrences:   1     Standing Expiration Date:   12/5/2025     Order Specific Question:   Release result to MyChart     Answer:   Immediate [1]    Varicella Zoster Antibody, IgG     Standing Status:   Future     Number of Occurrences:   1     Standing Expiration Date:   12/5/2025     Order Specific Question:   Release result to MyChart     Answer:   Immediate [1]     I have seen and evaluated the patient with Dr Darby, I agree with the assessment and plan, which were formulated with my direct input.   Joseph Kingsley MD PhD

## 2024-12-06 ENCOUNTER — SPECIALTY PHARMACY (OUTPATIENT)
Dept: NEUROLOGY | Facility: HOSPITAL | Age: 44
End: 2024-12-06
Payer: COMMERCIAL

## 2024-12-07 LAB
NIL(NEG) CONTROL SPOT COUNT: NORMAL
PANEL A SPOT COUNT: 3
PANEL B SPOT COUNT: 1
POS CONTROL SPOT COUNT: NORMAL
T-SPOT. TB INTERPRETATION: NEGATIVE

## 2024-12-08 LAB
CD19 CELLS # BLD: 0 X10E9/L
CD19 CELLS NFR BLD: 0 %
FLOW CYTOMETRY SPECIALIST REVIEW: ABNORMAL
LYMPHOCYTES # SPEC AUTO: 1.34 X10*3/UL
PATH REVIEW, B CELL PHENOTYPING, EXTENDED: ABNORMAL

## 2024-12-31 ENCOUNTER — HOSPITAL ENCOUNTER (OUTPATIENT)
Dept: RADIOLOGY | Facility: HOSPITAL | Age: 44
Discharge: HOME | End: 2024-12-31
Payer: COMMERCIAL

## 2024-12-31 DIAGNOSIS — G35 MULTIPLE SCLEROSIS (MULTI): ICD-10-CM

## 2024-12-31 PROCEDURE — A9575 INJ GADOTERATE MEGLUMI 0.1ML: HCPCS

## 2024-12-31 PROCEDURE — 2550000001 HC RX 255 CONTRASTS

## 2024-12-31 PROCEDURE — 70553 MRI BRAIN STEM W/O & W/DYE: CPT

## 2024-12-31 PROCEDURE — 70553 MRI BRAIN STEM W/O & W/DYE: CPT | Performed by: RADIOLOGY

## 2024-12-31 RX ORDER — GADOTERATE MEGLUMINE 376.9 MG/ML
15 INJECTION INTRAVENOUS
Status: COMPLETED | OUTPATIENT
Start: 2024-12-31 | End: 2024-12-31

## 2024-12-31 RX ADMIN — GADOTERATE MEGLUMINE 12 ML: 376.9 INJECTION INTRAVENOUS at 10:45

## 2025-01-08 ENCOUNTER — APPOINTMENT (OUTPATIENT)
Dept: PRIMARY CARE | Facility: CLINIC | Age: 45
End: 2025-01-08
Payer: COMMERCIAL

## 2025-01-23 DIAGNOSIS — Z30.9 ENCOUNTER FOR CONTRACEPTIVE MANAGEMENT, UNSPECIFIED TYPE: ICD-10-CM

## 2025-01-23 RX ORDER — NORGESTIMATE AND ETHINYL ESTRADIOL 7DAYSX3 28
1 KIT ORAL DAILY
Qty: 84 TABLET | Refills: 3 | Status: SHIPPED | OUTPATIENT
Start: 2025-01-23

## 2025-01-27 ENCOUNTER — APPOINTMENT (OUTPATIENT)
Dept: OPHTHALMOLOGY | Facility: CLINIC | Age: 45
End: 2025-01-27
Payer: COMMERCIAL

## 2025-01-27 DIAGNOSIS — H35.372 EPIRETINAL MEMBRANE (ERM) OF LEFT EYE: Primary | ICD-10-CM

## 2025-01-27 DIAGNOSIS — H47.20 OPTIC ATROPHY: ICD-10-CM

## 2025-01-27 PROCEDURE — 92134 CPTRZ OPH DX IMG PST SGM RTA: CPT | Performed by: STUDENT IN AN ORGANIZED HEALTH CARE EDUCATION/TRAINING PROGRAM

## 2025-01-27 PROCEDURE — 99213 OFFICE O/P EST LOW 20 MIN: CPT | Performed by: STUDENT IN AN ORGANIZED HEALTH CARE EDUCATION/TRAINING PROGRAM

## 2025-01-27 ASSESSMENT — ENCOUNTER SYMPTOMS
ENDOCRINE NEGATIVE: 0
GASTROINTESTINAL NEGATIVE: 0
HEMATOLOGIC/LYMPHATIC NEGATIVE: 0
NEUROLOGICAL NEGATIVE: 0
CONSTITUTIONAL NEGATIVE: 0
EYES NEGATIVE: 1
PSYCHIATRIC NEGATIVE: 0
CARDIOVASCULAR NEGATIVE: 0
RESPIRATORY NEGATIVE: 0
MUSCULOSKELETAL NEGATIVE: 0
ALLERGIC/IMMUNOLOGIC NEGATIVE: 0

## 2025-01-27 ASSESSMENT — SLIT LAMP EXAM - LIDS
COMMENTS: TRACE CAPPED GLANDS
COMMENTS: TRACE CAPPED GLANDS

## 2025-01-27 ASSESSMENT — TONOMETRY
IOP_METHOD: GOLDMANN APPLANATION
OS_IOP_MMHG: 14
OD_IOP_MMHG: 17

## 2025-01-27 ASSESSMENT — VISUAL ACUITY
OS_CC+: -2
METHOD: SNELLEN - LINEAR
OS_CC: 20/20
CORRECTION_TYPE: CONTACTS
OD_CC: 20/200

## 2025-01-27 ASSESSMENT — CONF VISUAL FIELD
OD_INFERIOR_TEMPORAL_RESTRICTION: 0
OS_NORMAL: 1
OS_SUPERIOR_TEMPORAL_RESTRICTION: 0
OD_SUPERIOR_NASAL_RESTRICTION: 0
OD_SUPERIOR_TEMPORAL_RESTRICTION: 0
OD_INFERIOR_NASAL_RESTRICTION: 0
METHOD: COUNTING FINGERS
OS_SUPERIOR_NASAL_RESTRICTION: 0
OS_INFERIOR_TEMPORAL_RESTRICTION: 0
OD_NORMAL: 1
OS_INFERIOR_NASAL_RESTRICTION: 0

## 2025-01-27 ASSESSMENT — CUP TO DISC RATIO: OD_RATIO: 0.1

## 2025-01-27 ASSESSMENT — EXTERNAL EXAM - RIGHT EYE: OD_EXAM: NORMAL

## 2025-01-27 ASSESSMENT — EXTERNAL EXAM - LEFT EYE: OS_EXAM: NORMAL

## 2025-01-27 NOTE — PROGRESS NOTES
Assessment/Plan   Diagnoses and all orders for this visit:  Optic atrophy  (+)med hx of multiple sclerosis currently controlled with Ocrevus   (+)hx of optic neuritis right eye-since event pt has had longstanding decrease in vision right eye  (+)afferent pupillary defect (APD) OD today with BCVA 20/200   (+)OCT RNFL demonstrates RNFL thinning OD; normal OS  (+)pt ed to RTC immediately for any changes to vision, eye pain, pain with eye movement  (+)monitor  Epiretinal membrane (ERM) of left eye  Lamellar Hole left eye  (+)ERM noted on DFE and OCT MAC today with early lamellar hole vs cystic changes   (+)Ed to monitor vision-RTC immediately for any changes-otherwise monitor every 6 months with DFE and OCT MAC  Dispensed amsler grid for patient to monitor-RTC immediately for changes  Myopia of both eyes  -Doing well with current Acuvue Oasys 1 Day OS only. Decrease in minus accepted performing well OS    RTC 6 months for annual with MRX/CL exam, DFE and MAC OCT

## 2025-02-05 ENCOUNTER — OFFICE VISIT (OUTPATIENT)
Dept: PRIMARY CARE | Facility: CLINIC | Age: 45
End: 2025-02-05
Payer: COMMERCIAL

## 2025-02-05 VITALS
WEIGHT: 129 LBS | HEIGHT: 64 IN | HEART RATE: 67 BPM | BODY MASS INDEX: 22.02 KG/M2 | OXYGEN SATURATION: 98 % | TEMPERATURE: 97.7 F | DIASTOLIC BLOOD PRESSURE: 70 MMHG | SYSTOLIC BLOOD PRESSURE: 112 MMHG

## 2025-02-05 DIAGNOSIS — G35 MULTIPLE SCLEROSIS (MULTI): ICD-10-CM

## 2025-02-05 DIAGNOSIS — E78.2 MIXED HYPERLIPIDEMIA: ICD-10-CM

## 2025-02-05 DIAGNOSIS — Z00.00 ANNUAL PHYSICAL EXAM: Primary | ICD-10-CM

## 2025-02-05 DIAGNOSIS — E03.8 SUBCLINICAL HYPOTHYROIDISM: ICD-10-CM

## 2025-02-05 DIAGNOSIS — Z12.31 SCREENING MAMMOGRAM FOR BREAST CANCER: ICD-10-CM

## 2025-02-05 DIAGNOSIS — E55.9 VITAMIN D DEFICIENCY: ICD-10-CM

## 2025-02-05 PROCEDURE — 1036F TOBACCO NON-USER: CPT | Performed by: INTERNAL MEDICINE

## 2025-02-05 PROCEDURE — 3008F BODY MASS INDEX DOCD: CPT | Performed by: INTERNAL MEDICINE

## 2025-02-05 PROCEDURE — 99396 PREV VISIT EST AGE 40-64: CPT | Performed by: INTERNAL MEDICINE

## 2025-02-05 ASSESSMENT — PROMIS GLOBAL HEALTH SCALE
RATE_MENTAL_HEALTH: VERY GOOD
RATE_PHYSICAL_HEALTH: EXCELLENT
CARRYOUT_SOCIAL_ACTIVITIES: EXCELLENT
RATE_SOCIAL_SATISFACTION: EXCELLENT
RATE_AVERAGE_PAIN: 0
RATE_GENERAL_HEALTH: VERY GOOD
CARRYOUT_PHYSICAL_ACTIVITIES: COMPLETELY
EMOTIONAL_PROBLEMS: SOMETIMES
RATE_QUALITY_OF_LIFE: EXCELLENT

## 2025-02-05 ASSESSMENT — ENCOUNTER SYMPTOMS
SEIZURES: 0
BLOOD IN STOOL: 0
COUGH: 0
WHEEZING: 0
VOMITING: 0
BACK PAIN: 0
MYALGIAS: 0
DYSURIA: 0
FATIGUE: 0
PALPITATIONS: 0
UNEXPECTED WEIGHT CHANGE: 0
DEPRESSION: 0
POLYDIPSIA: 0
TROUBLE SWALLOWING: 0
ABDOMINAL PAIN: 0
SHORTNESS OF BREATH: 0
POLYPHAGIA: 0
LOSS OF SENSATION IN FEET: 0
NUMBNESS: 0
FREQUENCY: 0
SINUS PRESSURE: 0
CHILLS: 0
OCCASIONAL FEELINGS OF UNSTEADINESS: 0
TREMORS: 0
NAUSEA: 0

## 2025-02-05 ASSESSMENT — COLUMBIA-SUICIDE SEVERITY RATING SCALE - C-SSRS
2. HAVE YOU ACTUALLY HAD ANY THOUGHTS OF KILLING YOURSELF?: NO
1. IN THE PAST MONTH, HAVE YOU WISHED YOU WERE DEAD OR WISHED YOU COULD GO TO SLEEP AND NOT WAKE UP?: NO
6. HAVE YOU EVER DONE ANYTHING, STARTED TO DO ANYTHING, OR PREPARED TO DO ANYTHING TO END YOUR LIFE?: NO

## 2025-02-05 ASSESSMENT — PAIN SCALES - GENERAL: PAINLEVEL_OUTOF10: 0-NO PAIN

## 2025-02-05 NOTE — PROGRESS NOTES
"Subjective   Patient ID: Wendy Suggs is a 44 y.o. female who presents for Annual Exam.    HPI     Has past medical history of multiple sclerosis. Symptoms well controlled on current Ocrevus infusion.         She is a marathon runner           Review of Systems   Constitutional:  Negative for chills, fatigue and unexpected weight change.   HENT:  Negative for postnasal drip, sinus pressure and trouble swallowing.    Respiratory:  Negative for cough, shortness of breath and wheezing.    Cardiovascular:  Negative for chest pain, palpitations and leg swelling.   Gastrointestinal:  Negative for abdominal pain, blood in stool, nausea and vomiting.   Endocrine: Negative for polydipsia, polyphagia and polyuria.   Genitourinary:  Negative for dysuria and frequency.   Musculoskeletal:  Negative for back pain and myalgias.   Skin:  Negative for rash.   Neurological:  Negative for tremors, seizures and numbness.   Psychiatric/Behavioral:  Negative for behavioral problems.        Objective   /70 (BP Location: Left arm, Patient Position: Sitting, BP Cuff Size: Small adult)   Pulse 67   Temp 36.5 °C (97.7 °F) (Temporal)   Ht 1.626 m (5' 4\")   Wt 58.5 kg (129 lb)   SpO2 98%   BMI 22.14 kg/m²     Physical Exam  Constitutional:       General: She is not in acute distress.  HENT:      Head: Normocephalic and atraumatic.      Right Ear: Tympanic membrane normal.      Left Ear: Tympanic membrane normal.   Eyes:      Extraocular Movements: Extraocular movements intact.      Conjunctiva/sclera: Conjunctivae normal.      Pupils: Pupils are equal, round, and reactive to light.   Neck:      Vascular: No carotid bruit.   Cardiovascular:      Rate and Rhythm: Normal rate and regular rhythm.      Pulses: Normal pulses.      Heart sounds: No murmur heard.  Pulmonary:      Effort: Pulmonary effort is normal.      Breath sounds: Normal breath sounds. No wheezing or rales.   Abdominal:      General: Bowel sounds are normal.      " Palpations: Abdomen is soft. There is no mass.      Tenderness: There is no abdominal tenderness. There is no guarding.   Musculoskeletal:         General: Normal range of motion.      Cervical back: Normal range of motion and neck supple.      Right lower leg: No edema.      Left lower leg: No edema.   Lymphadenopathy:      Cervical: No cervical adenopathy.   Skin:     General: Skin is warm and dry.      Findings: No lesion.   Neurological:      General: No focal deficit present.      Mental Status: She is alert and oriented to person, place, and time.      Cranial Nerves: No cranial nerve deficit.      Gait: Gait normal.   Psychiatric:         Mood and Affect: Mood normal.         Assessment/Plan        Wendy was seen today for annual exam.  Diagnoses and all orders for this visit:  Annual physical exam (Primary)  -     Comprehensive Metabolic Panel; Future  -     Comprehensive Metabolic Panel  Mixed hyperlipidemia  -     Lipid Panel; Future  -     Lipid Panel  Subclinical hypothyroidism  -     TSH with reflex to Free T4 if abnormal; Future  -     TSH with reflex to Free T4 if abnormal  Vitamin D deficiency  -     Vitamin D 25-Hydroxy,Total (for eval of Vitamin D levels); Future  -     Vitamin D 25-Hydroxy,Total (for eval of Vitamin D levels)  Multiple sclerosis (Multi)  Screening mammogram for breast cancer  -     BI mammo bilateral screening tomosynthesis; Future    Advised that she is due for Pap test this year    === 12/31/24 ===    MR BRAIN W AND WO CONTRAST    - Impression -  No significant interval changes of multifocal nonenhancing small  FLAIR hyper signal lesions involving the periventricular white  matter, subcortical white matter of the frontal, temporal, occipital,  and parietal lobes. Consistent with patient's known history of  demyelinating process. No active process identified.    Signed by: Catherine Darby 12/31/2024 3:51 PM  Dictation workstation:   [id number]        Current Outpatient Medications    Medication Instructions    biotin 10 mg tablet 1 tablet, Daily    cholecalciferol (Vitamin D-3) 25 MCG (1000 UT) tablet 1 tablet, Daily    ibuprofen 600 mg, Every 4 hours PRN    Lactobacillus acidophilus (Probiotic Acidophilus) 1.5 mg (250 million cell) capsule Probiotic Acidophilus Oral Capsule   Refills: 0        Allison Bingham MD   Start : 9-Dec-2022   Active    melatonin 5 mg, Nightly    ocrelizumab (OCREVUS IV) Infuse into a venous catheter. Every 6 months    Tri-Estarylla 0.18/0.215/0.25 mg-35 mcg (28) tablet 1 tablet, oral, Daily

## 2025-02-19 ENCOUNTER — HOSPITAL ENCOUNTER (OUTPATIENT)
Dept: RADIOLOGY | Facility: HOSPITAL | Age: 45
Discharge: HOME | End: 2025-02-19
Payer: COMMERCIAL

## 2025-02-19 VITALS — HEIGHT: 64 IN | BODY MASS INDEX: 22.02 KG/M2 | WEIGHT: 129 LBS

## 2025-02-19 DIAGNOSIS — Z12.31 SCREENING MAMMOGRAM FOR BREAST CANCER: ICD-10-CM

## 2025-02-19 PROCEDURE — 77067 SCR MAMMO BI INCL CAD: CPT | Performed by: RADIOLOGY

## 2025-02-19 PROCEDURE — 77063 BREAST TOMOSYNTHESIS BI: CPT | Performed by: RADIOLOGY

## 2025-02-19 PROCEDURE — 77067 SCR MAMMO BI INCL CAD: CPT

## 2025-02-20 LAB
25(OH)D3+25(OH)D2 SERPL-MCNC: 74 NG/ML (ref 30–100)
ALBUMIN SERPL-MCNC: 4.4 G/DL (ref 3.6–5.1)
ALP SERPL-CCNC: 39 U/L (ref 31–125)
ALT SERPL-CCNC: 10 U/L (ref 6–29)
ANION GAP SERPL CALCULATED.4IONS-SCNC: 10 MMOL/L (CALC) (ref 7–17)
AST SERPL-CCNC: 15 U/L (ref 10–30)
BILIRUB SERPL-MCNC: 0.4 MG/DL (ref 0.2–1.2)
BUN SERPL-MCNC: 16 MG/DL (ref 7–25)
CALCIUM SERPL-MCNC: 9.3 MG/DL (ref 8.6–10.2)
CHLORIDE SERPL-SCNC: 105 MMOL/L (ref 98–110)
CHOLEST SERPL-MCNC: 169 MG/DL
CHOLEST/HDLC SERPL: 1.8 (CALC)
CO2 SERPL-SCNC: 23 MMOL/L (ref 20–32)
CREAT SERPL-MCNC: 1.01 MG/DL (ref 0.5–0.99)
EGFRCR SERPLBLD CKD-EPI 2021: 71 ML/MIN/1.73M2
GLUCOSE SERPL-MCNC: 91 MG/DL (ref 65–99)
HDLC SERPL-MCNC: 95 MG/DL
LDLC SERPL CALC-MCNC: 60 MG/DL (CALC)
NONHDLC SERPL-MCNC: 74 MG/DL (CALC)
POTASSIUM SERPL-SCNC: 4.1 MMOL/L (ref 3.5–5.3)
PROT SERPL-MCNC: 6.5 G/DL (ref 6.1–8.1)
SODIUM SERPL-SCNC: 138 MMOL/L (ref 135–146)
TRIGL SERPL-MCNC: 65 MG/DL
TSH SERPL-ACNC: 4.34 MIU/L

## 2025-02-27 ENCOUNTER — APPOINTMENT (OUTPATIENT)
Dept: NEUROLOGY | Facility: CLINIC | Age: 45
End: 2025-02-27
Payer: COMMERCIAL

## 2025-06-05 ENCOUNTER — OFFICE VISIT (OUTPATIENT)
Dept: NEUROLOGY | Facility: CLINIC | Age: 45
End: 2025-06-05
Payer: COMMERCIAL

## 2025-06-05 VITALS
WEIGHT: 131 LBS | HEART RATE: 60 BPM | DIASTOLIC BLOOD PRESSURE: 79 MMHG | RESPIRATION RATE: 18 BRPM | SYSTOLIC BLOOD PRESSURE: 117 MMHG | BODY MASS INDEX: 22.49 KG/M2

## 2025-06-05 DIAGNOSIS — G35 MULTIPLE SCLEROSIS (MULTI): Primary | ICD-10-CM

## 2025-06-05 PROCEDURE — 99214 OFFICE O/P EST MOD 30 MIN: CPT | Mod: GC | Performed by: PSYCHIATRY & NEUROLOGY

## 2025-06-05 PROCEDURE — 99214 OFFICE O/P EST MOD 30 MIN: CPT | Performed by: PSYCHIATRY & NEUROLOGY

## 2025-06-05 ASSESSMENT — PAIN SCALES - GENERAL: PAINLEVEL_OUTOF10: 0-NO PAIN

## 2025-06-05 NOTE — PROGRESS NOTES
Subjective     Wendy Suggs is a 44 y.o. year old female for follow up of her MS.     History of Present Illness   [facility] NEURO IMMUNOLOGY HPI: Date of Onset: 2008  Date of Diagnosis: 2008  Last MRI Brain: 12/2024  Last MRI Cervical: 8/2023  Last MRI Thoracis: n/a  Last OCT/VEP: 7/2024  Last CBC: 12/2023  Disease Course at Onset: R (right optic neuritis)  Disease Course Now: R, inactive  Current Disease Modifying Therapies: started on Ocrevus 2019 and has been doing well  Previous Disease Modifying Therapies:  She took Betaseron, then Gilenya, but developed new lesions on MRI with both. She then switched to Tysabri but became positive for JCV.  Cerebrospinal Fluid: n/.a  Garret Gutierrez Virus: n/a  Varicella Zoster Virus: n/a  Hep Panel: n/a  Neuromyelitis Optica: n/a  Myelin Oligodendrocyte Glycoprotein: n/a    HPI  She first presented with right optic neuritis in 2008. It was blurry vision in right eye and she could not focus. As the week went on then she had eye pain as well. Things worsened so she went to emergency ophtho appointment and she was told she had some retinal pathology. She then some her PCP who had suspicions of MS and then she was diagnosed with MS and ordered MRI. She did not need spinal tap. Her right eye vision never cleared up. Kept having new lesion son MRI so transitioned between a few MRI treatments until she ended up on Ocrevus. She has never had additional symptoms. All her new lesions were silent lesions.    Interval events:  MS Symptom Review: Weakness: No Weakness  Sensory Changes: No sensory changes  Incoordination: No incoordination   Falling: No falling  Gait Change: No gait change  Painful Vision Loss: No painful vision loss  Double Vision: No double vision   Vertigo: No vertigo  Facial/Bulvar Weakness: No facial/bulvar weakness  Bladder/Bowel Dysfunction: No bladder/bowel dysfunction   Spasticity: No spasticity  Tonic Spasms: No tonic spasms  Tremors: No tremors  Restless Leg  Syndrome: No restless leg syndrome   Dystonia: No dystonia  Other Movement Disorders: No other movement disorders  Heat Sensitivity: No heat sensitivity  Fatigue: No fatigue  Depression: No depression   Recent Illness: Denies    Peg testin25: R 20.13 L 22.11    25-ft walk:  25: 6.15s    ROS  10-point review of systems was negative except as mentioned in the HPI and/or the [facility] form.     Patient Active Problem List   Diagnosis    Mixed hyperlipidemia    Multiple sclerosis (Multi)    Subclinical hypothyroidism    Vitamin D deficiency    Epiretinal membrane (ERM) of left eye    Optic atrophy    Myopia of both eyes        No past surgical history on file.  Social History     Tobacco Use    Smoking status: Former     Current packs/day: 0.00     Types: Cigarettes     Start date: 2000     Quit date: 2012     Years since quittin.8    Smokeless tobacco: Never   Substance Use Topics    Alcohol use: Yes     Alcohol/week: 3.0 standard drinks of alcohol     Types: 3 Cans of beer per week     Comment: Social drinker mostly on weekends     Family History   Problem Relation Name Age of Onset    Lymphoma Mother Shahnaz Pereira     Cancer Mother Shahnaz Pereiar     Diabetes Mother Shahnaz Pereira     Hypertension Mother Shahnaz Pereira     Hearing loss Maternal Grandmother Kenia Escobar     Diabetes Mother's Sister Margaret Escobar     Stroke Mother's Sister Margaret Escobar     Dementia Mother's Sister Margaret Escobar       No Known Allergies    Visit Vitals  OB Status Having periods   Smoking Status Former       Objective   Neurological Exam  Physical Exam    Mental status: Ms. Suggs is awake, alert and appropriate, with fluent speech, no word finding difficulties and no difficulty answering questions. She is well oriented to time, place and person. Her memory for remote events is intact. Her memory for recent events is normal. Attention and concentration is intact.  Her fund of knowledge is fair. No apraxia is  noted.    Fundoscopic examination: Fundi are flat with sharp disc margins bilaterally.  Cranial nerves:   CN 2: Pupils are equal, round and reactive to light bilaterally. Visual fields are full to confrontation without visual extinction. Endorses right eye blurry vision.  CN 3, 4, 6: Extraocular movements are intact.  Smooth pursuit is intact.  Saccades are normal initiation, velocity and amplitude both vertically and horizontally.   CN 5: Facial sensation is intact to light touch bilaterally.  CN 7: Facial strength is full bilaterally.   CN 8: Hearing is intact to finger rub bilaterally.  CN 9, 10: The soft palate elevates symmetrically.   CN 11: Sternocleidomastoid and trapezius muscle strength is full bilaterally.  CN 12: Tongue protrudes in the midline with intact strength. There are no tongue fasciculations noted.              Motor Examination: Bulk is normal throughout.  Axilla and upper and lower extremity tone is normal.  No pronator drift is noted.  Strength testing as below.  No fasciculations are noted.  There is no tremor or other involuntary movement.    Strength L R   Deltoid 5 5   Biceps 5 5   Triceps 5 5    5 5   Iliopsoas 5 5   Gluteal Ext 5 5   Gluteal Abduction 5 5   Quadriceps 5 5   Hamstrings 5 5   Tensor Fasciae Latae 5 5   TA 5 5   Gastroc 5 5     Sensory examination: Sensation is intact to light touch.  Reflexes: Positioning during reflex exam: patient sitting in chair. Reflex testing as below.    Reflexes L R   Biceps 1 1   Triceps trace trace   Brachioradialis trace trace   Patellar 1 1   Achilles 0 0     Coordination: Finger-nose-finger and heel-knee-shin testing is normal with no dysmetria bilaterally.   Gait: She has an upright and steady stance with normal stride length and arm swing.  Toe, heel, and tandem walking is intact.  No Romberg's sign is present.     Assessment/Plan   This is a 44 y.o. year old  right handed female  With a PMH of MS who is following up for her history of  MS. She is initially presented in 2008 with R eye optic neuritis, now only with residual blurry vision. Currently, she has been well-controlled on ocrevus with no new symptoms concerning for an MS flair. Will continue her q6m Ocrevus infusions and yearly MRIs.     Plan:  - Continues ocrevus infusions, blood work to be drawn during next ocrevus session   - MRI w w/o contrast ordered for 12/2025  - Follow up in 6 months    Nicole Quiroga MD  PGY-2 Neurology

## 2025-07-28 ENCOUNTER — APPOINTMENT (OUTPATIENT)
Dept: OPHTHALMOLOGY | Facility: CLINIC | Age: 45
End: 2025-07-28
Payer: COMMERCIAL

## 2025-07-28 DIAGNOSIS — H52.13 MYOPIA OF BOTH EYES: Primary | ICD-10-CM

## 2025-07-28 DIAGNOSIS — H35.342 LAMELLAR MACULAR HOLE OF LEFT EYE: ICD-10-CM

## 2025-07-28 DIAGNOSIS — H35.372 EPIRETINAL MEMBRANE (ERM) OF LEFT EYE: ICD-10-CM

## 2025-07-28 DIAGNOSIS — H47.20 OPTIC ATROPHY: ICD-10-CM

## 2025-07-28 PROCEDURE — 92014 COMPRE OPH EXAM EST PT 1/>: CPT | Performed by: STUDENT IN AN ORGANIZED HEALTH CARE EDUCATION/TRAINING PROGRAM

## 2025-07-28 PROCEDURE — FLVLF CONTACT LENS EVALUATION (SP): Performed by: STUDENT IN AN ORGANIZED HEALTH CARE EDUCATION/TRAINING PROGRAM

## 2025-07-28 PROCEDURE — 92015 DETERMINE REFRACTIVE STATE: CPT | Performed by: STUDENT IN AN ORGANIZED HEALTH CARE EDUCATION/TRAINING PROGRAM

## 2025-07-28 PROCEDURE — 92134 CPTRZ OPH DX IMG PST SGM RTA: CPT | Performed by: STUDENT IN AN ORGANIZED HEALTH CARE EDUCATION/TRAINING PROGRAM

## 2025-07-28 ASSESSMENT — REFRACTION_MANIFEST
OD_SPHERE: -1.25
OD_AXIS: 068
OS_ADD: +2.00
OS_SPHERE: -2.25
OS_CYLINDER: -0.50
OS_AXIS: 014
OD_SPHERE: -0.75
OD_AXIS: 158
OD_ADD: +2.00
OS_AXIS: 105
OS_CYLINDER: +0.50
OD_CYLINDER: +0.50
OD_CYLINDER: -0.50
OS_SPHERE: -1.75
METHOD_AUTOREFRACTION: 1

## 2025-07-28 ASSESSMENT — REFRACTION
OD_AXIS: 068
OS_AXIS: 105
OD_CYLINDER: -0.50
OS_SPHERE: -1.25
OS_CYLINDER: -0.50
OD_SPHERE: -0.75

## 2025-07-28 ASSESSMENT — ENCOUNTER SYMPTOMS
MUSCULOSKELETAL NEGATIVE: 0
NEUROLOGICAL NEGATIVE: 0
ENDOCRINE NEGATIVE: 0
HEMATOLOGIC/LYMPHATIC NEGATIVE: 0
CARDIOVASCULAR NEGATIVE: 0
RESPIRATORY NEGATIVE: 0
ALLERGIC/IMMUNOLOGIC NEGATIVE: 0
PSYCHIATRIC NEGATIVE: 0
EYES NEGATIVE: 1
CONSTITUTIONAL NEGATIVE: 0
GASTROINTESTINAL NEGATIVE: 0

## 2025-07-28 ASSESSMENT — EXTERNAL EXAM - LEFT EYE: OS_EXAM: NORMAL

## 2025-07-28 ASSESSMENT — CONF VISUAL FIELD
OD_SUPERIOR_NASAL_RESTRICTION: 0
OS_SUPERIOR_NASAL_RESTRICTION: 0
OS_INFERIOR_TEMPORAL_RESTRICTION: 0
OD_INFERIOR_NASAL_RESTRICTION: 0
OS_SUPERIOR_TEMPORAL_RESTRICTION: 0
OD_SUPERIOR_TEMPORAL_RESTRICTION: 0
OS_INFERIOR_NASAL_RESTRICTION: 0
METHOD: COUNTING FINGERS
OD_NORMAL: 1
OD_INFERIOR_TEMPORAL_RESTRICTION: 0
OS_NORMAL: 1

## 2025-07-28 ASSESSMENT — VISUAL ACUITY
METHOD: SNELLEN - LINEAR
OD_SC: 20/200
OS_CC: 20/20
OD_SC+: -1
VA_OR_OS_CURRENT_RX: 20/20
CORRECTION_TYPE: CONTACTS

## 2025-07-28 ASSESSMENT — TONOMETRY
OD_IOP_MMHG: 17
OS_IOP_MMHG: 17
IOP_METHOD: GOLDMANN APPLANATION

## 2025-07-28 ASSESSMENT — SLIT LAMP EXAM - LIDS
COMMENTS: TRACE CAPPED GLANDS
COMMENTS: TRACE CAPPED GLANDS

## 2025-07-28 ASSESSMENT — REFRACTION_CURRENTRX
OS_CYLINDER: SPHERE
OS_BRAND: ACUVUE OASYS 1 DAY
OS_SPHERE: -2.00
OS_BASECURVE: 8.5
OS_DIAMETER: 14.3

## 2025-07-28 ASSESSMENT — REFRACTION_WEARINGRX
OS_SPHERE: -2.00
OD_CYLINDER: SPHERE
OS_CYLINDER: SPHERE
OD_SPHERE: -1.50

## 2025-07-28 ASSESSMENT — CUP TO DISC RATIO: OD_RATIO: 0.1

## 2025-07-28 ASSESSMENT — EXTERNAL EXAM - RIGHT EYE: OD_EXAM: NORMAL

## 2025-07-28 NOTE — PROGRESS NOTES
Assessment/Plan   Diagnoses and all orders for this visit:  Optic atrophy  (+)med hx of multiple sclerosis currently controlled with Ocrevus   (+)hx of optic neuritis right eye-since event pt has had longstanding decrease in vision right eye  (+)afferent pupillary defect (APD) OD today with BCVA 20/200   (+)Last OCT RNFL demonstrates RNFL thinning OD; normal OS  (+)pt ed to RTC immediately for any changes to vision, eye pain, pain with eye movement  (+)monitor  Epiretinal membrane (ERM) of left eye  Lamellar Hole left eye  (+)ERM noted on DFE and OCT MAC today with early lamellar hole vs cystic changes   (+)Ed to monitor vision-RTC immediately for any changes-otherwise monitor every 6 months with DFE and OCT MAC  Dispensed amsler grid for patient to monitor-RTC immediately for changes  Stable findings on testing today  Myopia of both eyes  -Doing well with current Acuvue Oasys 1 Day OS only. Decrease in minus accepted performing well OS.  -New Rx for glasses given per patient request. Patient's signature obtained to acknowledge and confirm that a paper copy of glasses Rx was given to patient in compliance with [facility] Eyeglass Rule. Electronic copy of Rx will also be available via [facility]EPIC.   -Discussed proper wear, care, replacement of contact lenses. Gave handout. D/c cl wear and RTC if eyes become red, painful, irritated. Monitor 1 year.   CL eval billed today. $35     RTC 6 months for DFE and MAC OCT

## 2025-08-04 ENCOUNTER — DOCUMENTATION (OUTPATIENT)
Dept: OPHTHALMOLOGY | Facility: CLINIC | Age: 45
End: 2025-08-04
Payer: COMMERCIAL

## 2025-08-04 ASSESSMENT — REFRACTION_CURRENTRX
OS_BRAND: ACUVUE OASYS 1 DAY
OS_DIAMETER: 14.3
OS_BASECURVE: 8.5
OS_SPHERE: -1.25

## 2025-08-04 NOTE — PROGRESS NOTES
Patient only wears CL OS only. Previous RX was a -1.25 sph OS but at last exam was switched to a more distance RX -2.00. Patient messaged having difficulty focusing for near and developing headaches. Will switch back to a -1.25 and send trials to confirm this works well. Pt will message with results.

## 2025-08-04 NOTE — Clinical Note
Ramesh Cervantes, can you send trials of the -1.25 lenses listed direct to patient OS only. Thanks!

## 2025-08-15 ENCOUNTER — DOCUMENTATION (OUTPATIENT)
Dept: OPHTHALMOLOGY | Facility: CLINIC | Age: 45
End: 2025-08-15
Payer: COMMERCIAL

## 2025-08-15 ASSESSMENT — REFRACTION_CURRENTRX
OS_BASECURVE: 8.5
OS_DIAMETER: 14.3
OS_BRAND: ACUVUE OASYS 1 DAY
OS_SPHERE: -1.25

## 2025-09-03 ENCOUNTER — APPOINTMENT (OUTPATIENT)
Dept: OBSTETRICS AND GYNECOLOGY | Facility: CLINIC | Age: 45
End: 2025-09-03
Payer: COMMERCIAL

## 2026-01-08 ENCOUNTER — APPOINTMENT (OUTPATIENT)
Dept: NEUROLOGY | Facility: CLINIC | Age: 46
End: 2026-01-08
Payer: COMMERCIAL